# Patient Record
Sex: MALE | Race: WHITE | NOT HISPANIC OR LATINO | URBAN - METROPOLITAN AREA
[De-identification: names, ages, dates, MRNs, and addresses within clinical notes are randomized per-mention and may not be internally consistent; named-entity substitution may affect disease eponyms.]

---

## 2017-04-12 ENCOUNTER — OFFICE (OUTPATIENT)
Dept: URBAN - METROPOLITAN AREA CLINIC 78 | Facility: CLINIC | Age: 77
End: 2017-04-12
Payer: COMMERCIAL

## 2017-04-12 VITALS
HEIGHT: 72 IN | DIASTOLIC BLOOD PRESSURE: 95 MMHG | SYSTOLIC BLOOD PRESSURE: 165 MMHG | WEIGHT: 246 LBS | TEMPERATURE: 97.8 F | HEART RATE: 66 BPM

## 2017-04-12 DIAGNOSIS — Z86.010 PERSONAL HISTORY OF COLONIC POLYPS: ICD-10-CM

## 2017-04-12 DIAGNOSIS — K21.9 GASTRO-ESOPHAGEAL REFLUX DISEASE WITHOUT ESOPHAGITIS: ICD-10-CM

## 2017-04-12 PROCEDURE — 99213 OFFICE O/P EST LOW 20 MIN: CPT

## 2017-04-12 NOTE — SERVICEHPINOTES
77 yo white male presents for f/u GERD. Has been doing well on omeprazole 20 mg. He had an EGD in 2014 for intermittent solid-food dysphagia--results revealed reflux esophagitis with benign biopsies and no evidence of Dunn's. Doing well overall. He denies dysphagia or reflux. He has h/o chronic constipation and has found that drinking keanu tea twice a day works well to maintain regularity without the need for stool softeners. He is having a daily BM. Last colonoscopy was in 2013 with recall advised for 2018 due to adenomatous polyp.